# Patient Record
(demographics unavailable — no encounter records)

---

## 2024-12-20 NOTE — CONSULT LETTER
[Dear  ___] : Dear  [unfilled], [Courtesy Letter:] : I had the pleasure of seeing your patient, [unfilled], in my office today. [Sincerely,] : Sincerely, [FreeTextEntry2] : Peyton Villatoro MD [address] [place], [place] [id number] [FreeTextEntry1] : Merna Cardona is a 15-year-old female who presents today for evaluation of a concussion.  On 11/16/2024 patient tripped over her shoelaces and struck the back of her head on the deck. She was evaluated by her PCP.  Patient reports she was doing well and was cleared for dance and gym.  Unfortunately symptoms have returned.  She reports there was no loss of consciousness after the head injury.  No seizure activities were noted.  She remembers events happening before and after the injury.  Overall she reports improvement in her symptoms.  Patient endorses headache.  She uses Tylenol as needed.  She reports light sensitivity.  She reports difficulties with using her Chrome books and reading hardcopies.  Mom indicates grades have not been affected.  She reports noise sensitivity, nausea, dizziness, more tired than usual, feeling like in a fog, and difficulties with concentration and memory.  She denies neck pain, vomiting, unbalanced, hard to fall asleep, sleeping more than usual, mood changes, or confusion.  Patient appears in no acute distress. She is alert and oriented to time and date. When given a list of 5 words she was able to recite all 5 immediately after. After a brief pause, she was able to recite all 5 words again. After another brief pause, she was able to recite 5 out of the 5 words. She was able to recite up to 3 numbers in reverse order without difficulty. She was also able to recite the months in reverse order without difficulty. Patient displays full cervical range of motion.  No cervical tenderness noted. Motor and sensory exam intact. Deep tendon reflexes intact. Motor coordination as evidenced by finger to nose testing intact. Cranial nerves intact. Pupils equal and reactive to light. Hearing intact. Sense of smell intact. No facial droop noted. Tongue protrudes in the midline. Facial sensation and movement symmetric and normal. Strength equal and normal to bilateral upper and lower extremities. No nystagmus noted. No saccades noted. Abnormal vestibular ocular reflex.  Dizziness elicited with head turns on fixed point and head turns with ambulation. Patient does not display difficulties with tandem stance and tandem walk. She is able to perform double and single leg stance without difficulty. Negative pronator drift. Negative Romberg's. Patient was able to recall 3 out of the 5 words after a 10 minute pause. Orientation score 5/5, Immediate memory score 15/15, concentration score 2/5, delayed memory score 3/5. Total cognitive inefficiency score 25/30.  Negative Mary's.  Negative clonus.  No convergence insufficiency noted.  I provided patient with a referral for neuro-ophthalmology.  I have also provided patient with a referral for vestibular therapy.  I provided the patient with school accommodations including untimed exams, tutoring as needing and printing out notes.  At this time, advised patient to avoid any sports (dance)or gym that may place the patient at risk of another head injury.  I have recommended blue light blocking lenses and dimming computer screens.  Recommended noise canceling her phones and earplugs.  Will follow-up in approximately 3 weeks or sooner should symptoms worsen.  Patient and mom aware to call with any further questions or concerns or with any new or worsening symptoms.  At this time, defer images as she is improving however they will contact office with any worsening or new symptoms. [FreeTextEntry3] : NAKIA Zafar, FMOISESP- BLissC.  [place] [address] Tel: [phone]

## 2024-12-20 NOTE — CONSULT LETTER
[Dear  ___] : Dear  [unfilled], [Courtesy Letter:] : I had the pleasure of seeing your patient, [unfilled], in my office today. [Sincerely,] : Sincerely, [FreeTextEntry2] : Peyton Villatoro MD [address] [place], [place] [id number] [FreeTextEntry1] : Merna Cardona is a 15-year-old female who presents today for evaluation of a concussion.  On 11/16/2024 patient tripped over her shoelaces and struck the back of her head on the deck. She was evaluated by her PCP.  Patient reports she was doing well and was cleared for dance and gym.  Unfortunately symptoms have returned.  She reports there was no loss of consciousness after the head injury.  No seizure activities were noted.  She remembers events happening before and after the injury.  Overall she reports improvement in her symptoms.  Patient endorses headache.  She uses Tylenol as needed.  She reports light sensitivity.  She reports difficulties with using her Chrome books and reading hardcopies.  Mom indicates grades have not been affected.  She reports noise sensitivity, nausea, dizziness, more tired than usual, feeling like in a fog, and difficulties with concentration and memory.  She denies neck pain, vomiting, unbalanced, hard to fall asleep, sleeping more than usual, mood changes, or confusion.  Patient appears in no acute distress. She is alert and oriented to time and date. When given a list of 5 words she was able to recite all 5 immediately after. After a brief pause, she was able to recite all 5 words again. After another brief pause, she was able to recite 5 out of the 5 words. She was able to recite up to 3 numbers in reverse order without difficulty. She was also able to recite the months in reverse order without difficulty. Patient displays full cervical range of motion.  No cervical tenderness noted. Motor and sensory exam intact. Deep tendon reflexes intact. Motor coordination as evidenced by finger to nose testing intact. Cranial nerves intact. Pupils equal and reactive to light. Hearing intact. Sense of smell intact. No facial droop noted. Tongue protrudes in the midline. Facial sensation and movement symmetric and normal. Strength equal and normal to bilateral upper and lower extremities. No nystagmus noted. No saccades noted. Abnormal vestibular ocular reflex.  Dizziness elicited with head turns on fixed point and head turns with ambulation. Patient does not display difficulties with tandem stance and tandem walk. She is able to perform double and single leg stance without difficulty. Negative pronator drift. Negative Romberg's. Patient was able to recall 3 out of the 5 words after a 10 minute pause. Orientation score 5/5, Immediate memory score 15/15, concentration score 2/5, delayed memory score 3/5. Total cognitive inefficiency score 25/30.  Negative Mary's.  Negative clonus.  No convergence insufficiency noted.  I provided patient with a referral for neuro-ophthalmology.  I have also provided patient with a referral for vestibular therapy.  I provided the patient with school accommodations including untimed exams, tutoring as needing and printing out notes.  At this time, advised patient to avoid any sports (dance)or gym that may place the patient at risk of another head injury.  I have recommended blue light blocking lenses and dimming computer screens.  Recommended noise canceling her phones and earplugs.  Will follow-up in approximately 3 weeks or sooner should symptoms worsen.  Patient and mom aware to call with any further questions or concerns or with any new or worsening symptoms.  At this time, defer images as she is improving however they will contact office with any worsening or new symptoms. [FreeTextEntry3] : NAKIA Zafar, FMOISESP- BLissC.  [place] [address] Tel: [phone]

## 2024-12-30 NOTE — REASON FOR VISIT
[Follow-up Evaluation] : a follow-up evaluation [Head Injury Evaluation] : a head injury evaluation [Parent] : parent

## 2024-12-30 NOTE — CONSULT LETTER
[Dear  ___] : Dear  [unfilled], [Courtesy Letter:] : I had the pleasure of seeing your patient, [unfilled], in my office today. [Sincerely,] : Sincerely, [FreeTextEntry2] : Peyton Villatoro MD [address] [place], [place] [id number] [FreeTextEntry1] : Merna Cardona is a 13-year-old female who presents today for follow up to her concussion. On 11/16/2024 patient tripped over her shoelaces and struck the back of her head on the deck. She was evaluated by her PCP. Patient reports she was doing well and was cleared for dance and gym. Unfortunately symptoms had returned. She reports there was no loss of consciousness after the head injury. No seizure activities were noted. She remembers events happening before and after the injury.  Patient presents with her father today.  Patient reports feeling 90% like her self again.  She was evaluated by an ophthalmologist without any concerns as per patient.  She is scheduled to follow-up with them in 1 week.  Patient is undergoing vestibular therapy.  Father has no new concerns at this time.  Patient reports she experiences daily headaches which resolve on its own.  She reports this has slightly improved.  She reports light sensitivity and imbalance has improved.  She reports feeling like in a fog.  She denies noise sensitivity, nausea or vomiting, neck pain, dizziness or sleep difficulties.  She denies mood changes, confusion, or difficulties with concentration or memory.  Patient appears in no acute distress. She is alert and oriented to time and date. When given a list of 5 words she was able to recite all 5 immediately after. After a brief pause, she was able to recite all 5 words again. After another brief pause, she was able to recite 5 out of the 5 words. She was able to recite up to 3 numbers in reverse order without difficulty. She was also able to recite the months in reverse order without difficulty. Patient displays full cervical range of motion.  No cervical tenderness noted. Motor and sensory exam intact. Deep tendon reflexes intact. Motor coordination as evidenced by finger to nose testing intact. Cranial nerves intact. Pupils equal and reactive to light. Hearing intact. Sense of smell intact. No facial droop noted. Tongue protrudes in the midline. Facial sensation and movement symmetric and normal. Strength equal and normal to bilateral upper and lower extremities. No nystagmus noted. No saccades noted. Normal vestibular ocular reflex. No symptoms elicited with head turns on fixed point and head turns with ambulation. Patient does not display difficulties with tandem stance and tandem walk. She is able to perform double and single leg stance without difficulty. Negative pronator drift. Negative Romberg's. Patient was able to recall 2 out of the 5 words after a 10 minute pause. Orientation score 5/5, Immediate memory score 15/15, concentration score 2/5, delayed memory score 2/5. Total cognitive inefficiency score 24/30.  Negative Mary's.  Negative clonus.  I have advised patient to continue with vestibular therapy.  I provided the patient with a prescription for an MRI of the head as per moms request which I believe is reasonable.  They will contact my office once MRI is completed.  Advised patient to continue follow-up with ophthalmologist.  Provided patient with school accommodations including no sports or physical education as well as dance.  I have also provided academic accommodations.  We will follow-up in approximately 4 weeks in office.  Parent and patient aware to call with any further questions or concerns or with any new or worsening. [FreeTextEntry3] : Tereza Poe, MSN, [facility] Nurse Practitioner Neurosurgery [address], [address] [place] [address]  Office: [phone]  Fax: [phone]

## 2025-01-22 NOTE — ASSESSMENT
[FreeTextEntry1] : 12 yo female s/p head injury resulting in concussion with normal MRI Brain as per report.  - Symptoms are likely related to lifestyle factors rather than the concussion itself. - Orthostatic dizziness possibly due to inadequate hydration. - No evidence of a clinically important traumatic brain injury at this stage.

## 2025-01-22 NOTE — PLAN
[FreeTextEntry1] : Plan - Implement lifestyle changes focusing on hydration and nutrition. Emphasize the importance of drinking more water and not skipping meals to support recovery and overall health. - Initiate endurance training to improve physical fitness and support recovery from concussion symptoms. A script for endurance training will be provided. If patient is tolerating would proceed to Return to play protocol.  - Engage in controlled exercise to gradually increase activity levels. This should be done in a safe environment, avoiding outdoor activities when conditions are slippery. - Once endurance training is tolerated, proceed to the return to play protocol to assess readiness for resuming dance and gym activities. - Continue follow-up care with either Dr. Poe or me, based on patient and family preference, to monitor progress and adjust the plan as needed.

## 2025-01-22 NOTE — PHYSICAL EXAM
[Well-appearing] : well-appearing [Normocephalic] : normocephalic [No dysmorphic facial features] : no dysmorphic facial features [No ocular abnormalities] : no ocular abnormalities [Neck supple] : neck supple [No abnormal neurocutaneous stigmata or skin lesions] : no abnormal neurocutaneous stigmata or skin lesions [Straight] : straight [No nidia or dimples] : no nidia or dimples [No deformities] : no deformities [Alert] : alert [Well related, good eye contact] : well related, good eye contact [Conversant] : conversant [Normal speech and language] : normal speech and language [Follows instructions well] : follows instructions well [VFF] : VFF [Pupils reactive to light and accommodation] : pupils reactive to light and accommodation [Full extraocular movements] : full extraocular movements [No nystagmus] : no nystagmus [No papilledema] : no papilledema [Normal facial sensation to light touch] : normal facial sensation to light touch [No facial asymmetry or weakness] : no facial asymmetry or weakness [Gross hearing intact] : gross hearing intact [Equal palate elevation] : equal palate elevation [Good shoulder shrug] : good shoulder shrug [Normal tongue movement] : normal tongue movement [Midline tongue, no fasciculations] : midline tongue, no fasciculations [R handed] : R handed [Normal axial and appendicular muscle tone] : normal axial and appendicular muscle tone [Gets up on table without difficulty] : gets up on table without difficulty [No pronator drift] : no pronator drift [Normal finger tapping and fine finger movements] : normal finger tapping and fine finger movements [No abnormal involuntary movements] : no abnormal involuntary movements [5/5 strength in proximal and distal muscles of arms and legs] : 5/5 strength in proximal and distal muscles of arms and legs [Walks and runs well] : walks and runs well [Able to do deep knee bend] : able to do deep knee bend [Able to walk on heels] : able to walk on heels [Able to walk on toes] : able to walk on toes [2+ biceps] : 2+ biceps [Triceps] : triceps [Knee jerks] : knee jerks [Ankle jerks] : ankle jerks [No ankle clonus] : no ankle clonus [Bilaterally] : bilaterally [Localizes LT and temperature] : localizes LT and temperature [No dysmetria on FTNT] : no dysmetria on FTNT [Good walking balance] : good walking balance [Normal gait] : normal gait [Able to tandem well] : able to tandem well [Negative Romberg] : negative Romberg

## 2025-01-22 NOTE — CONSULT LETTER
[Dear  ___] : Dear  [unfilled], [Consult Letter:] : I had the pleasure of evaluating your patient, [unfilled]. [Please see my note below.] : Please see my note below. [Consult Closing:] : Thank you very much for allowing me to participate in the care of this patient.  If you have any questions, please do not hesitate to contact me. [Sincerely,] : Sincerely, [FreeTextEntry3] : Theresa Lanier MD Medical Director, Pediatric Concussion Program  , [employer] [facility] Department of Pediatric Neurology [facility]  [address], [address] Tel: [phone] Fax: [phone]

## 2025-01-22 NOTE — HISTORY OF PRESENT ILLNESS
[FreeTextEntry1] : Chief complaint Headache and dizziness following a concussion sustained on November 15, 2024.   History of present illness - Merna Cardona, 13-year-old female who sustained a concussion on November 15, 2024, after hitting head on a desk and falling down stairs - Initial symptoms included headaches and nausea - Symptoms recurred, including headaches and dizziness - Headaches mostly occur in the afternoon, lasting the whole day, with a severity of 7-8 out of 10, located in the back and front of the head - Dizziness occurs daily, often when standing up, described as feeling lightheaded or like fainting - Seen in the ER on January 9, 2025, for concussion symptoms - Underwent an MRI, which was normal - Eye doctor visit due to seeing spots; exam was normal - Lifestyle factors include skipping lunch and inadequate hydration, potentially contributing to symptoms - Currently not participating in dance or gym activities due to symptoms - Follow by Dr. Poe from Concussion Program, recommended Vestibular therapy and no gym or sports.    Past medical history - Concussion sustained in November 2024   Family history - No family history of migraines   Social history - Participates in dance competitions - Skips lunch regularly - Not a great eater - Drinks 3-4 bottles of water daily - Sleeps from 10:00 PM to 6:30 AM   Current medications - Zofran, taken about a week ago - Tylenol, taken recently - Motrin, taken recently    Imaging results - MRI: Normal

## 2025-02-05 NOTE — CONSULT LETTER
[Dear  ___] : Dear  [unfilled], [Courtesy Letter:] : I had the pleasure of seeing your patient, [unfilled], in my office today. [Sincerely,] : Sincerely, [FreeTextEntry2] : Peyton Villatoro  [address],  [place] [address] [FreeTextEntry1] : Merna Cardona is a 13-year-old female who presents today for follow up to her concussion. On 11/16/2024 patient tripped over her shoelaces and struck the back of her head on the deck. She was evaluated by her PCP. Patient reports she was doing well and was cleared for dance and gym. Unfortunately, symptoms had returned. She reports there was no loss of consciousness after the head injury. No seizure activities were noted. She remembers events happening before and after the injury.  Patient presents today with her grandmother.  Patient reports she feels like herself again.  Patient denies headaches, light or noise sensitivity, nausea or vomiting, or neck pain.  She denies any dizziness or imbalance.  She denies any sleep difficulties, mood changes, feeling like in a fog, confusion, or difficulties with concentration or memory.  Patient was undergoing vestibular therapy.  She was recently assessed by a pediatric neurologist who has recommended endurance training.  She reports she had undergone 2 days of endurance training with that experiencing any symptoms.  Patient appears in no acute distress. She is alert and oriented to time and date. When given a list of 5 words she was able to recite all 5 immediately after. After a brief pause, she was able to recite all 5 words again. After another brief pause, she was able to recite 5 out of the 5 words. She was able to recite up to 4 numbers in reverse order without difficulty. She was also able to recite the months in reverse order without difficulty. Patient displays full cervical range of motion.  No cervical tenderness noted. Motor and sensory exam intact. Deep tendon reflexes intact. Motor coordination as evidenced by finger to nose testing intact. Cranial nerves intact. Pupils equal and reactive to light. Hearing intact. Sense of smell intact. No facial droop noted. Tongue protrudes in the midline. Facial sensation and movement symmetric and normal. Strength equal and normal to bilateral upper and lower extremities. No nystagmus noted. No saccades noted. Normal vestibular ocular reflex. No symptoms elicited with head turns on fixed point and head turns with ambulation. Patient does not display difficulties with tandem stance and tandem walk. She is able to perform double and single leg stance without difficulty. Negative pronator drift. Negative Romberg's. Patient was able to recall 3 out of the 5 words after a 10 minute pause. Orientation score 5/5, Immediate memory score 15/15, concentration score 3/5, delayed memory score 3/5. Total cognitive inefficiency score 26/30.  Patient is recovering well from her concussion.  At this time, I would recommend proceeding with vestibular/endurance therapy.  Once patient completes therapy, she may initiate return to play protocol.  Patient and family will contact office once return to play protocol is completed or sooner should she develop any new or recurrent symptoms.   Addendum 2/5/2025-patient underwent MRI of the brain performed on 1/9/2025 [facility].  Report indicates normal MRI of the brain. Reviewed MRI with Dr. Tijerina. [FreeTextEntry3] : NAKIA Zafar, FMOISESP- BLissC.  [place] [address] Tel: [phone]

## 2025-02-05 NOTE — CONSULT LETTER
[Dear  ___] : Dear  [unfilled], [Courtesy Letter:] : I had the pleasure of seeing your patient, [unfilled], in my office today. [Sincerely,] : Sincerely, [FreeTextEntry2] : Peyton Villatoro  [address],  [place] [address] [FreeTextEntry1] : Merna Cardona is a 13-year-old female who presents today for follow up to her concussion. On 11/16/2024 patient tripped over her shoelaces and struck the back of her head on the deck. She was evaluated by her PCP. Patient reports she was doing well and was cleared for dance and gym. Unfortunately, symptoms had returned. She reports there was no loss of consciousness after the head injury. No seizure activities were noted. She remembers events happening before and after the injury.  Patient presents today with her grandmother.  Patient reports she feels like herself again.  Patient denies headaches, light or noise sensitivity, nausea or vomiting, or neck pain.  She denies any dizziness or imbalance.  She denies any sleep difficulties, mood changes, feeling like in a fog, confusion, or difficulties with concentration or memory.  Patient was undergoing vestibular therapy.  She was recently assessed by a pediatric neurologist who has recommended endurance training.  She reports she had undergone 2 days of endurance training with that experiencing any symptoms.  Patient appears in no acute distress. She is alert and oriented to time and date. When given a list of 5 words she was able to recite all 5 immediately after. After a brief pause, she was able to recite all 5 words again. After another brief pause, she was able to recite 5 out of the 5 words. She was able to recite up to 4 numbers in reverse order without difficulty. She was also able to recite the months in reverse order without difficulty. Patient displays full cervical range of motion.  No cervical tenderness noted. Motor and sensory exam intact. Deep tendon reflexes intact. Motor coordination as evidenced by finger to nose testing intact. Cranial nerves intact. Pupils equal and reactive to light. Hearing intact. Sense of smell intact. No facial droop noted. Tongue protrudes in the midline. Facial sensation and movement symmetric and normal. Strength equal and normal to bilateral upper and lower extremities. No nystagmus noted. No saccades noted. Normal vestibular ocular reflex. No symptoms elicited with head turns on fixed point and head turns with ambulation. Patient does not display difficulties with tandem stance and tandem walk. She is able to perform double and single leg stance without difficulty. Negative pronator drift. Negative Romberg's. Patient was able to recall 3 out of the 5 words after a 10 minute pause. Orientation score 5/5, Immediate memory score 15/15, concentration score 3/5, delayed memory score 3/5. Total cognitive inefficiency score 26/30.  Patient is recovering well from her concussion.  At this time, I would recommend proceeding with vestibular/endurance therapy.  Once patient completes therapy, she may initiate return to play protocol.  Patient and family will contact office once return to play protocol is completed or sooner should she develop any new or recurrent symptoms.   Addendum 2/5/2025-patient underwent MRI of the brain performed on 1/9/2025 at [facility].  Report indicates normal MRI of the brain. Reviewed MRI with Dr. Tijerina. [FreeTextEntry3] : NAKIA Zafar, FMOISESP- BLissC.  [place] [address] Tel: [phone]